# Patient Record
Sex: FEMALE | URBAN - METROPOLITAN AREA
[De-identification: names, ages, dates, MRNs, and addresses within clinical notes are randomized per-mention and may not be internally consistent; named-entity substitution may affect disease eponyms.]

---

## 2021-09-22 ENCOUNTER — OFFICE VISIT (OUTPATIENT)
Dept: URBAN - METROPOLITAN AREA CLINIC 41 | Facility: CLINIC | Age: 66
End: 2021-09-22
Payer: COMMERCIAL

## 2021-09-22 DIAGNOSIS — H35.713 CENTRAL SEROUS CHORIORETINOPATHY, BILATERAL: Primary | ICD-10-CM

## 2021-09-22 DIAGNOSIS — H25.13 AGE-RELATED NUCLEAR CATARACT, BILATERAL: ICD-10-CM

## 2021-09-22 PROCEDURE — 92134 CPTRZ OPH DX IMG PST SGM RTA: CPT | Performed by: OPHTHALMOLOGY

## 2021-09-22 PROCEDURE — 99204 OFFICE O/P NEW MOD 45 MIN: CPT | Performed by: OPHTHALMOLOGY

## 2021-09-22 PROCEDURE — 92235 FLUORESCEIN ANGRPH MLTIFRAME: CPT | Performed by: OPHTHALMOLOGY

## 2021-09-22 ASSESSMENT — INTRAOCULAR PRESSURE
OD: 12
OS: 13

## 2021-09-22 NOTE — IMPRESSION/PLAN
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 Bilateral. Plan: --exam/OCT reveal multiple areas of RPE mottling OU
--FA: stippled hyperfluorescence OU (extrafoveal), no leakage OU
--findings/diagnosis d/w pt in detail
--observe given lack of active leakage in either eye
--discussed [employer] grid self-monitoring
--avoid exogenous steroids at all times --pt instructed to call with s/s [facility] RTC 3 months for OCT OU

## 2022-01-12 ENCOUNTER — OFFICE VISIT (OUTPATIENT)
Dept: URBAN - METROPOLITAN AREA CLINIC 41 | Facility: CLINIC | Age: 67
End: 2022-01-12
Payer: COMMERCIAL

## 2022-01-12 PROCEDURE — 99213 OFFICE O/P EST LOW 20 MIN: CPT | Performed by: OPHTHALMOLOGY

## 2022-01-12 PROCEDURE — 92134 CPTRZ OPH DX IMG PST SGM RTA: CPT | Performed by: OPHTHALMOLOGY

## 2022-01-12 ASSESSMENT — INTRAOCULAR PRESSURE
OS: 14
OD: 15

## 2022-01-12 NOTE — IMPRESSION/PLAN
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 Bilateral. Plan: --exam/OCT reveal stable areas of RPE mottling OU
--FA from 9/22/2021: stippled hyperfluorescence without leakage OU
--findings/diagnosis d/w pt in detail
--observe given lack of active leakage in either eye
--discussed [employer] grid self-monitoring
--avoid exogenous steroids at all times --pt instructed to call with s/s [facility] RTC PRN

## 2024-08-24 ENCOUNTER — OFFICE VISIT (OUTPATIENT)
Dept: URBAN - METROPOLITAN AREA CLINIC 24 | Facility: CLINIC | Age: 69
End: 2024-08-24
Payer: MEDICARE

## 2024-08-24 DIAGNOSIS — H16.223 KERATOCONJUNCTIVITIS SICCA, BILATERAL: ICD-10-CM

## 2024-08-24 DIAGNOSIS — H25.813 COMBINED FORMS OF AGE-RELATED CATARACT, BILATERAL: ICD-10-CM

## 2024-08-24 DIAGNOSIS — H47.393 OTHER DISORDERS OF OPTIC DISC, BILATERAL: Primary | ICD-10-CM

## 2024-08-24 DIAGNOSIS — H35.713 CENTRAL SEROUS CHORIORETINOPATHY, BILATERAL: ICD-10-CM

## 2024-08-24 PROCEDURE — 92133 CPTRZD OPH DX IMG PST SGM ON: CPT | Performed by: STUDENT IN AN ORGANIZED HEALTH CARE EDUCATION/TRAINING PROGRAM

## 2024-08-24 PROCEDURE — 92004 COMPRE OPH EXAM NEW PT 1/>: CPT | Performed by: STUDENT IN AN ORGANIZED HEALTH CARE EDUCATION/TRAINING PROGRAM

## 2024-08-24 ASSESSMENT — INTRAOCULAR PRESSURE
OS: 18
OD: 18

## 2024-08-24 ASSESSMENT — VISUAL ACUITY
OD: 20/20
OS: 20/30

## 2024-10-16 ENCOUNTER — OFFICE VISIT (OUTPATIENT)
Dept: URBAN - METROPOLITAN AREA CLINIC 24 | Facility: CLINIC | Age: 69
End: 2024-10-16
Payer: MEDICARE

## 2024-10-16 DIAGNOSIS — H52.4 PRESBYOPIA: Primary | ICD-10-CM

## 2024-10-16 ASSESSMENT — KERATOMETRY
OS: 44.98
OD: 44.10

## 2024-10-16 ASSESSMENT — VISUAL ACUITY
OS: 20/20
OD: 20/20